# Patient Record
Sex: MALE | Race: BLACK OR AFRICAN AMERICAN | Employment: UNEMPLOYED | URBAN - METROPOLITAN AREA
[De-identification: names, ages, dates, MRNs, and addresses within clinical notes are randomized per-mention and may not be internally consistent; named-entity substitution may affect disease eponyms.]

---

## 2019-07-21 ENCOUNTER — HOSPITAL ENCOUNTER (EMERGENCY)
Age: 39
Discharge: HOME | End: 2019-07-21
Payer: COMMERCIAL

## 2019-07-21 VITALS
SYSTOLIC BLOOD PRESSURE: 145 MMHG | RESPIRATION RATE: 18 BRPM | OXYGEN SATURATION: 97 % | DIASTOLIC BLOOD PRESSURE: 87 MMHG | TEMPERATURE: 98 F | HEART RATE: 100 BPM

## 2019-07-21 DIAGNOSIS — S01.532A PUNCTURE WOUND OF ORAL CAVITY, INITIAL ENCOUNTER: Primary | ICD-10-CM

## 2019-07-21 PROCEDURE — 99282 EMERGENCY DEPT VISIT SF MDM: CPT

## 2019-07-21 ASSESSMENT — PAIN DESCRIPTION - PAIN TYPE: TYPE: ACUTE PAIN

## 2019-07-21 ASSESSMENT — PAIN SCALES - GENERAL: PAINLEVEL_OUTOF10: 7

## 2019-07-21 ASSESSMENT — PAIN DESCRIPTION - LOCATION: LOCATION: MOUTH
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
## 2023-01-01 ENCOUNTER — HOSPITAL ENCOUNTER (EMERGENCY)
Age: 43
End: 2023-02-27
Attending: EMERGENCY MEDICINE
Payer: COMMERCIAL

## 2023-01-01 DIAGNOSIS — I46.9 CARDIAC ARREST (HCC): ICD-10-CM

## 2023-01-01 DIAGNOSIS — S21.339A GUNSHOT WOUND OF CHEST CAVITY, UNSPECIFIED LATERALITY, INITIAL ENCOUNTER: Primary | ICD-10-CM

## 2023-01-01 PROCEDURE — 99285 EMERGENCY DEPT VISIT HI MDM: CPT

## 2023-02-27 NOTE — ED NOTES
[facility] office picked up patient at this time.      Ildefonso Tyler, RN  02/27/23 8264

## 2023-02-27 NOTE — PROGRESS NOTES
SPD were able to provide us with an alternative number for patient's sister, Najma Goodrich [phone]. Dr. Tracey Ordonez spoke with Carolina Mcdonald and provided her with information on the patient's passing. Carolina Mcdonald stated she and her older brother are the only siblings left, Carolina Mcdonald stated she will be calling this brother in the morning. Carolina Mcdonald stated they don't know a  home yet, but will call with details.

## 2023-02-27 NOTE — PROGRESS NOTES
Alternative phone number for Isabela Gupta [phone] rings, [facility]
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
## 2023-02-27 NOTE — PROGRESS NOTES
Home number for patient found in records called, [phone], number goes straight to voicemail that has not been set up.

## 2023-02-27 NOTE — ED NOTES
called at this time and provided information and all questions answered to [facility].      Lupillo Jasmine RN  02/27/23 7048